# Patient Record
Sex: FEMALE
[De-identification: names, ages, dates, MRNs, and addresses within clinical notes are randomized per-mention and may not be internally consistent; named-entity substitution may affect disease eponyms.]

---

## 2020-03-24 LAB
ALBUMIN SERPL-MCNC: 3.7 G/DL (ref 3.5–5)
ALBUMIN/GLOB SERPL: 1 {RATIO} (ref 0.8–2)
ALP SERPL-CCNC: 147 IU/L (ref 40–150)
ALT SERPL-CCNC: 13 IU/L (ref 0–55)
ANION GAP SERPL CALC-SCNC: 12.3 MMOL/L (ref 8–16)
BASOPHILS # BLD AUTO: 0.1 10*3/UL (ref 0–0.1)
BASOPHILS NFR BLD AUTO: 1.2 % (ref 0–1)
BUN SERPL-MCNC: 28 MG/DL (ref 7–26)
BUN/CREAT SERPL: 17 (ref 6–25)
CALCIUM SERPL-MCNC: 9.2 MG/DL (ref 8.4–10.2)
CHLORIDE SERPL-SCNC: 108 MMOL/L (ref 98–107)
CO2 SERPL-SCNC: 24 MMOL/L (ref 22–29)
DEPRECATED INR PLAS: 1
DEPRECATED NEUTROPHILS # BLD AUTO: 6.3 10*3/UL (ref 2.1–6.9)
EGFRCR SERPLBLD CKD-EPI 2021: 31 ML/MIN (ref 60–?)
EOSINOPHIL # BLD AUTO: 0.2 10*3/UL (ref 0–0.4)
EOSINOPHIL NFR BLD AUTO: 2.2 % (ref 0–6)
ERYTHROCYTE [DISTWIDTH] IN CORD BLOOD: 12.8 % (ref 11.7–14.4)
GLOBULIN PLAS-MCNC: 3.6 G/DL (ref 2.3–3.5)
GLUCOSE SERPLBLD-MCNC: 199 MG/DL (ref 74–118)
HCT VFR BLD AUTO: 44.1 % (ref 34.2–44.1)
HGB BLD-MCNC: 13.8 G/DL (ref 12–16)
LYMPHOCYTES # BLD: 1.6 10*3/UL (ref 1–3.2)
LYMPHOCYTES NFR BLD AUTO: 18.4 % (ref 18–39.1)
MCH RBC QN AUTO: 29.4 PG (ref 28–32)
MCHC RBC AUTO-ENTMCNC: 31.3 G/DL (ref 31–35)
MCV RBC AUTO: 93.8 FL (ref 81–99)
MONOCYTES # BLD AUTO: 0.4 10*3/UL (ref 0.2–0.8)
MONOCYTES NFR BLD AUTO: 4.9 % (ref 4.4–11.3)
NEUTS SEG NFR BLD AUTO: 73.1 % (ref 38.7–80)
PLATELET # BLD AUTO: 263 X10E3/UL (ref 140–360)
POTASSIUM SERPL-SCNC: 4.3 MMOL/L (ref 3.5–5.1)
PROTHROMBIN TIME: 13.8 SECONDS (ref 11.9–14.5)
RBC # BLD AUTO: 4.7 X10E6/UL (ref 3.6–5.1)
SODIUM SERPL-SCNC: 140 MMOL/L (ref 136–145)

## 2020-03-26 ENCOUNTER — HOSPITAL ENCOUNTER (OUTPATIENT)
Dept: HOSPITAL 88 - CATH LAB | Age: 75
Discharge: HOME | End: 2020-03-26
Attending: INTERNAL MEDICINE
Payer: MEDICARE

## 2020-03-26 VITALS — DIASTOLIC BLOOD PRESSURE: 77 MMHG | SYSTOLIC BLOOD PRESSURE: 172 MMHG

## 2020-03-26 VITALS — DIASTOLIC BLOOD PRESSURE: 72 MMHG | SYSTOLIC BLOOD PRESSURE: 118 MMHG

## 2020-03-26 VITALS — DIASTOLIC BLOOD PRESSURE: 77 MMHG | SYSTOLIC BLOOD PRESSURE: 112 MMHG

## 2020-03-26 VITALS — DIASTOLIC BLOOD PRESSURE: 66 MMHG | SYSTOLIC BLOOD PRESSURE: 117 MMHG

## 2020-03-26 VITALS — BODY MASS INDEX: 24.35 KG/M2 | HEIGHT: 60 IN | WEIGHT: 124 LBS

## 2020-03-26 VITALS — DIASTOLIC BLOOD PRESSURE: 61 MMHG | SYSTOLIC BLOOD PRESSURE: 127 MMHG

## 2020-03-26 VITALS — SYSTOLIC BLOOD PRESSURE: 111 MMHG | DIASTOLIC BLOOD PRESSURE: 77 MMHG

## 2020-03-26 VITALS — SYSTOLIC BLOOD PRESSURE: 127 MMHG | DIASTOLIC BLOOD PRESSURE: 61 MMHG

## 2020-03-26 VITALS — DIASTOLIC BLOOD PRESSURE: 70 MMHG | SYSTOLIC BLOOD PRESSURE: 137 MMHG

## 2020-03-26 VITALS — DIASTOLIC BLOOD PRESSURE: 74 MMHG | SYSTOLIC BLOOD PRESSURE: 137 MMHG

## 2020-03-26 VITALS — DIASTOLIC BLOOD PRESSURE: 80 MMHG | SYSTOLIC BLOOD PRESSURE: 118 MMHG

## 2020-03-26 VITALS — DIASTOLIC BLOOD PRESSURE: 72 MMHG | SYSTOLIC BLOOD PRESSURE: 177 MMHG

## 2020-03-26 DIAGNOSIS — Z79.82: ICD-10-CM

## 2020-03-26 DIAGNOSIS — I10: ICD-10-CM

## 2020-03-26 DIAGNOSIS — I25.2: ICD-10-CM

## 2020-03-26 DIAGNOSIS — Z82.49: ICD-10-CM

## 2020-03-26 DIAGNOSIS — Z95.1: ICD-10-CM

## 2020-03-26 DIAGNOSIS — Z01.812: ICD-10-CM

## 2020-03-26 DIAGNOSIS — Z79.02: ICD-10-CM

## 2020-03-26 DIAGNOSIS — I25.810: Primary | ICD-10-CM

## 2020-03-26 DIAGNOSIS — E78.5: ICD-10-CM

## 2020-03-26 PROCEDURE — 93459 L HRT ART/GRFT ANGIO: CPT

## 2020-03-26 PROCEDURE — 80053 COMPREHEN METABOLIC PANEL: CPT

## 2020-03-26 PROCEDURE — 99153 MOD SED SAME PHYS/QHP EA: CPT

## 2020-03-26 PROCEDURE — 85025 COMPLETE CBC W/AUTO DIFF WBC: CPT

## 2020-03-26 PROCEDURE — 92928 PRQ TCAT PLMT NTRAC ST 1 LES: CPT

## 2020-03-26 PROCEDURE — 85610 PROTHROMBIN TIME: CPT

## 2020-03-26 PROCEDURE — 99152 MOD SED SAME PHYS/QHP 5/>YRS: CPT

## 2020-03-26 PROCEDURE — 36415 COLL VENOUS BLD VENIPUNCTURE: CPT

## 2020-03-26 NOTE — OPERATIVE REPORT
DATE OF PROCEDURE:  

 

SURGEON:  David Nelson DO

 

PROCEDURES PERFORMED:  

1. Conscious sedation, 45 minutes.

2. Selective coronary angiography x2.

3. Selective graft angiography.

4. Left heart catheterization.

5. Injection procedure for supravalvular aortography.

6. Percutaneous transluminal coronary angioplasty and drug-eluting stent placement to

the right coronary artery. 

 

PREPROCEDURE DIAGNOSES:  

1. Systolic congestive heart failure.

2. Coronary artery disease, status post coronary artery bypass graft surgery.

 

POSTPROCEDURE DIAGNOSES:  

1. Systolic congestive heart failure.

2. Coronary artery disease, status post coronary artery bypass graft surgery.

 

ESTIMATED BLOOD LOSS:  Less than 20 mL.

 

SPECIMENS REMOVED:  None.

 

PROCEDURE IN DETAIL:  After informed consent was obtained, the patient was brought to

the cardiac catheterization laboratory in a fasting and nonsedated state.  Bilateral

groins were prepped and draped in the usual sterile fashion.  Lidocaine 2% was

infiltrated over the right anterior groin for local anesthesia.  Using a micropuncture

needle, right common femoral artery was accessed via the modified Seldinger technique

and a 5-[place] sheath was placed.  The patient received fentanyl and midazolam given by

the nurse.  Myself and the nurse monitored the patient's physiologic and conscious

status throughout the procedure for 45 minutes.  Diagnostic coronary angiography was

performed using a JL4 and 3DRC catheters.  Diagnostic graft angiography was performed

using a 3DRC and LCB catheters.  Aortography was performed using a pigtail catheter.

This was also used to perform the left heart catheterization.  Diagnostic imaging

revealed a patent left internal mammary artery to the LAD and a patent saphenous vein

graft to an obtuse marginal.  There were two other saphenous vein grafts that were

occluded.  Decision was made to perform percutaneous coronary intervention on the right

coronary artery due to a proximal 90% stenosis.  The right coronary artery was

cannulated with a 6-[place] JR4 guide catheter.  The patient received systemic heparin

for therapeutic anticoagulation.  ACT was greater than 250.  The patient was given

clopidogrel.  The proximal lesion was predilated with a 2.5 balloon.  The lesion was

stented with a 3 x 20 Synergy drug-eluting stent.  The patient tolerated the procedure

well with no immediate complications.  There was excellent angioplasty and stenting

results without any evidence of edge dissection, distal embolization, or vessel

perforation at the end of the case.  She was transferred back to room in stable

condition.  Hemostasis was achieved via Mynx device. 

 

PROCEDURE FINDINGS:  

1. There is severe diffuse disease in the left main.

2. There is a bifurcating stent in the LAD and first diagonal.  The diagonal branch has

mild to moderate disease.  The LAD is occluded just after the stent. 

3. The ramus intermedius coronary artery has mild-to-moderate diffuse disease.

4. The left circumflex coronary provides, has a proximal 70% stenosis.  There is

competitive flow seen in obtuse marginal vessel. 

5. The right coronary artery has a proximal 90% stenosis.  The PDA has a severe subtotal

stenosis.  This vessel fills via collaterals from the left coronary system. 

6. There are two saphenous vein grafts that are occluded.  The third vein graft could

not be selectively cannulated, however, fills when aortography was performed. 

7. Left internal mammary artery is patent and fills the left anterior descending

coronary artery distally. 

8. Left ventricular end-diastolic pressure is 20 mmHg with no aortic valve gradient

present upon pullback. 

 

INTERVENTIONAL RESULTS:  Successful percutaneous coronary intervention of the right

coronary artery.  Pre-PCI MELE flow was 2.  Post PCI MELE flow was 3.  Postprocedure

stenosis less than 10%. 

 

IMPRESSION:  

1. Severe multivessel coronary artery disease with a patent saphenous vein and left

internal mammary artery grafts. 

2. Severe right coronary artery stenosis.

 

RECOMMENDATIONS:  The patient is now status post percutaneous coronary intervention of

the right coronary artery.  Continue dual antiplatelet therapy and aggressive medical

therapy for heart failure and coronary artery disease. 

 

 

 

 

______________________________

DO BRITTANY Baca/MODL

D:  03/26/2020 09:55:10

T:  03/26/2020 10:21:27

Job #:  [id number]

## 2020-03-26 NOTE — NUR
1330pm CATH LAB RECOVERY  DISCHARGE NURSING 
NOTE--------------------------------------------------------



Pt meets DC criteria.Rt groin assessed for s/s of complication and presence of hematoma. 
Skin warm, dry, no discolor, and pulses present. IV removed from left hand. Distal tip 
appears intact. VS WNL. Pt denies pain, sob, or need at this time. Family at bedside. Review 
of discharge paperwork and follow up instructions. verbalized understanding. Pt to 
wheelchair and transported to front of hospital. Transferred to private vehicle under own 
strength w/o incident with DC paperwork in hand. - karlie/rn

## 2020-03-26 NOTE — XMS REPORT
Patient Summary Document

                             Created on: 2020



CODY CHAPPELL

External Reference #: [id number]

: 1945

Sex: Female



Demographics







                          Address                   [address] 

[place]  [address]

 

                          Home Phone                [phone]

 

                          Preferred Language        Unknown

 

                          Marital Status            Unknown

 

                          Hindu Affiliation     Unknown

 

                          Race                      Unknown

 

                          Ethnic Group              Unknown





Author







                          Author                    [facility]

 

                          Address                   Unknown

 

                          Phone                     Unavailable







Support







                Name            Relationship    Address         Phone

 

                    JOBY ROSALES      PRS                 [address] [place]  [id number] [phone]

 

                    JASMIN ARAUZ     PRS                 [address], [place]  [id number] [phone]







Care Team Providers







                    Care Team Member Name    Role                Phone

 

                          Unavailable               Unavailable







Payers







             Payer Name    Policy Type    Policy Number    Effective Date    Expiration Date







Problems

This patient has no known problems.



Allergies, Adverse Reactions, Alerts







          Allergy Name    Allergy Type    Status    Severity    Reaction(s)    Onset Date    Inactive 

Date                      Treating Clinician        Comments

 

        morphine    DA      Active    MO              2019 00:00:00                     

 

        hydrocodone    DA      Active    MO              2019 00:00:00                     

 

        acetaminophen    DA      Active    MO              2019 00:00:00                     

 

        hydromorphone    DA      Active    MO              2019 00:00:00                     

 

        acetaminophen    DA      Active    MO              2018 00:00:00                     

 

        morphine    DA      Active    MO              2018 00:00:00                     

 

        hydrocodone    DA      Active    MO              2018 00:00:00                     

 

        hydromorphone    DA      Active    MO              2018 00:00:00                     







Medications

This patient has no known medications.



Encounters







             Start Date/Time    End Date/Time    Encounter Type    Admission Type    Attending Clinicians

                    Care Facility       Care Department     Encounter ID

 

        2020 23:11:00    2020 23:11:00    Outpatient    E               [facility]    MED     [id number]







Results







           Test Description    Test Time    Test Comments    Text Results    Atomic Results    Result

 Comments

 

                ARTERIAL BLOOD GAS    2019 23:57:00                      

 

   

 

                ARTERIAL BLOOD GAS PH (test code=PHA)    7.430           7.35-7.45        

 

                ARTERIAL BLOOD GAS PCO2 (test code=PCO2A)    37.0 mmHg       35-45            

 

                ARTERIAL BLOOD GAS PO2 (test code=PO2A)    85 mmHg                    

 

                BICARBONATE TOTAL HCO3 (test code=HCO3)    24.5 mmol/L     22.0-26.0        

 

                BASE EXCESS (test code=LIDIA)    0.0 mmol/L      -4-4             

 

                ABG O2 SATURATION (test code=SATA)    97 %                       

 

                ABG DELIVERY (test code=DONATO)    Room Air                        Performed by certified  at  

[facility]

 

                ABG TEMPERATURE (test code=TEMPA)    98.6 F                           

 

                ABG SITE (test code=SITEA)    L Rad                            

 

                TCO2 ARTERIAL (test code=TCO2A)    26                               





- XR CHEST 1 -93-82 23:41:00 FAX: Edwardo Lopez [phone]
   [place]:   St: PRE----------
---------------------------------------------------------------------  Name:   CODY HENDERSON             [facility]                    : [birth year]  Age/S: 74/F           [address] [place] [address]         Unit #: [id number]    
 Loc: [address] [address]                 Phys: Edwardo Rodas MD 
                                              Acct: [id number] Dis Date:      
        Status: PRE ER                                 PHONE #: [phone]    
Exam Date:     2019                   FAX #: [phone]     
Reason: SOB                                                EXAMS:               
                               CPT CODE:      [id number] XR CHEST 1 V            
                  [id number]                    Study:  - XR CHEST 1 V 2019 
10:48 PM               Patient Name: CODY CHAPPELL MR: [id number]       YOB: 1945; Age: 74 years  y/o Female       Ordering Physician: Edwardo gonzalez MD               Clinical Indication: SOB               Comparison: 2019 x-ray               FINDINGS               LUNGS: The lungs are clear o
f consolidation, pleural effusion, and       pneumothorax.               HEART A
ND MEDIASTINUM: Heart size at the upper limits of normal to       mildly enlarge
d with a left subclavian automatic implantable cardiac       defibrillator. Medi
an sternotomy wires.               LINES: None.               OSSEOUS STRUCTURES
: No fracture, dislocation, or suspicious focal       osseous lesion.           
   OTHER: None.                         IMPRESSION:                   No acute 
abnormality as above discussed.                                       SL: MIKE  
       ** Electronically Signed by KATHY Baeza on 2019 at 2000 **   
                  Reported and signed by: Aneudy Baeza M.D.      CC: Edwardo galvez MD                                                                     
                                               Technologist: RT Yoni
(R)                                  Trnscrd Date/Time/By: 2019 (127) : BRANDEE
y: [email or web address]          Orig Print D/T: S: 2019 ([id number])                     
   PAGE  1                       Signed Report                               
HEPATIC FUNCTION [id number] 23:35:00* 





                Test Item       Value           Reference Range    Comments

 

                TOTAL PROTEIN (test code=PROT)    8.5 g/dL        6.4-8.2          

 

                ALBUMIN (test code=ALB)    3.70 g/dL       3.4-5.0          

 

                BILIRUBIN TOTAL (test code=BILT)    0.6 MG/DL       <1.5             

 

                BILIRUBIN DIRECT (test code=BILD)    0.20 MG/DL      0.0-0.30         

 

                BILIRUBIN INDIRECT (test code=BILIND)    0.40 MG/DL                       

 

                SGOT/AST (test code=AST)    20 IUnit/L      15-37            

 

                SGPT/ALT (test code=ALT)    18 IUnit/L      15-65            

 

                ALKALINE PHOSPHATASE TOTAL (test code=ALKP)    166 IUnit/L                





[id number] 23:35:00* 





                Test Item       Value           Reference Range    Comments

 

                LIPASE (test code=LIP)    154 IUnit/L                





[id number] 23:35:00* 





                Test Item       Value           Reference Range    Comments

 

                MAGNESIUM (test code=MAG)    2.10 mg/dL      1.8-2.4          





TSH REFLEX TO [id number] 23:35:00* 





                Test Item       Value           Reference Range    Comments

 

                TSH REFLEX TO FT4 (test code=TSHREFLEX)    6.36 IU/mL      0.42-5.47        





B-TYPE NATRIURETIC [id number] 23:33:00* 





                Test Item       Value           Reference Range    Comments

 

                B-TYPE NATRIURETIC PEPTIDE (test code=BNP)    829.0 PG/ML     0-100            





PROTHROMBIN [id number] 23:21:00* 





                Test Item       Value           Reference Range    Comments

 

                PROTHROMBIN TIME PATIENT (test code=PTP)    11.2 SECONDS    9.3-12.9         

 

                INTERNATIONAL NORMAL RATIO (test code=INR)    1.0             0.8-1.2                         TARGET

 INR BY INDICATION   Indication                                      INR1. 
Prophylaxis of venous thrombosis             2.0 - 3.0   (orthopedic surgery), 
Prophylaxis of   venous thrombosis (other than high-risk   surgery), Treatment 
of Deep Vein   Thrombosis/Pulmonary Embolism, Prevention   of systemic embolism 
- Tissue heart valves,   Acute Myocardial Infarction (to prevent   systemic 
embolism), Valvular heart disease,   Atrial Fibrillation, Bileaflet mechanical  
valve in aortic position.2. Mechanical prosthetic valves (high risk),    2.5 - 
3.5   Presence of Lupus Anticoagulant or   Antiphospholipid Antibodies, 
Prevention of   systemic embolism - Acute Myocardial Infarction   (to prevent 
recurrent infarct).





THROMBOPLASTIN TIME [id number] 23:21:00* 





                Test Item       Value           Reference Range    Comments

 

                THROMBOPLASTIN TIME PARTIAL (test code=PTT)    22.3 Seconds    25.0-39.5           Therapeutic

 Range: 50.4 - 88.3 Seconds        **** Effective 2019 ****





[id number] 23:21:00* 





                Test Item       Value           Reference Range    Comments

 

                D-DIMER (test code=DDIMER)    571 ng/mlFEU    <=500            THROMBOSIS AND/OR PULMONARY EMBOLISM

 AND THE CLINICAL CUT- OFF VALUE FOR EXCLUSION (500 ng/mL FEU) OF THESE 
CONDITIONSIS VALIDATED BY THE  OF THE METHOD. A NEGATIVE D-DIMER 
RESULT WHEN COMBINED WITH A CLINICALASSESSMENT OF LOW PRETEST PROBABILITY HAS 
BEEN SHOWN TO HAVEA HIGH NEGATIVE PREDICTIVE VALUE OF DVT OR PE. D-DIMER VALUES 
>500 ng/mL FEU ARE NOT DIAGNOSTIC FOR DVT, PEor DIC WITHOUT OTHER CONFIRMATORY 
TESTS AND APPROPRIATECLINICAL EUALUATIONS.





TROPONIN-I [id number] 23:12:00* 





                Test Item       Value           Reference Range    Comments

 

                TROPONIN-I RAPID (test code=TROPIRAP)    0.00 ng/mL      0.00-0.08       Performed by certified

  at  [facility]          Negative:          <=0.08          
Positive:          >=0.09An elevated troponin value alone is not sufficient 
todiagnose a myocardial infarction. Rather, the patient sclinical presentation 
(history, physical exam) and ECGshould be used in conjunction with troponin in 
thediagnostic evaluation of suspected myocardial infarction. Aserial sampling 
protocol is recommended to facilitate the identification of temporal changes in 
troponin levels characteristic of MI.





CBC W/AUTO [id number] 23:09:00* 





                Test Item       Value           Reference Range    Comments

 

                WHITE BLOOD CELL (test code=WBC)    10.77 x10 3/uL    4.5-11.0         

 

                RED BLOOD CELL (test code=RBC)    4.70 x10 6/uL    3.54-5.02        

 

                HEMOGLOBIN (test code=HGB)    14.1 g/dL       11.0-15.0        

 

                HEMATOCRIT (test code=HCT)    44.4 %          33.0-45.0        

 

                MEAN CELL VOLUME (test code=MCV)    94.5 fL         81.0-99.0        

 

                MEAN CELL HGB (test code=MCH)    30.0 pg         27.0-33.0        

 

                MEAN CELL HGB CONCETRATION (test code=MCHC)    31.8 g/dL       33.0-37.0        

 

                RED CELL DISTRIBUTION WIDTH CV (test code=RDW)    13.5 %          11.5-14.5        

 

                RED CELL DISTRIBUTION WIDTH SD (test code=RDW-SD)    47.0 fL         37.0-54.0        

 

                PLATELET COUNT (test code=PLT)    216 x10 3/uL    150-400          

 

                MEAN PLATELET VOLUME (test code=MPV)    9.9 fL          7.0-9.0          

 

                NEUTROPHIL % (test code=NT%)    66.3 %          56.0-77.0        

 

                IMMATURE GRANULOCYTE % (test code=IG%)    0.5 %           0.0-2.0          

 

                LYMPHOCYTE % (test code=LY%)    23.4 %          14.0-32.0        

 

                MONOCYTE % (test code=MO%)    6.2 %           4.8-9.0          

 

                EOSINOPHIL % (test code=EO%)    2.3 %           0.3-3.7          

 

                BASOPHIL % (test code=BA%)    1.3 %           0.0-2.0          

 

                NUCLEATED RBC % (test code=NRBC%)    0.0 %           0-0              

 

                NEUTROPHIL # (test code=NT#)    7.14 x10 3/uL    2.0-7.6          

 

                IMMATURE GRANULOCYTE # (test code=IG#)    0.05 x10 3/uL    0.00-0.03        

 

                LYMPHOCYTE # (test code=LY#)    2.52 x10 3/uL    1.0-3.8          

 

                MONOCYTE # (test code=MO#)    0.67 x10 3/uL    0.1-0.8          

 

                EOSINOPHIL # (test code=EO#)    0.25 x10 3/uL    0.0-0.2          

 

                BASOPHIL # (test code=BA#)    0.14 x10 3/uL    0.0-0.2          

 

                NUCLEATED RBC # (test code=NRBC#)    0.00 x10 3/uL    0.0-0.1          

 

                MANUAL DIFF REQUIRED (test code=MDIFF)    NO                               





CHEMISTRY 8 [id number] 23:07:00* 





                Test Item       Value           Reference Range    Comments

 

                ISTAT-SODIUM (test code=NAP)     MMOL/L         134-147          

 

                ISTAT-POTASSIUM (test code=KP)     MMOL/L         3.4-5.0          

 

                ISTAT-CHLORIDE (test code=CLP)     MMOL/L         100-108          

 

                ISTAT CARBON DIOXIDE (test code=ISTAT-CO2)     mmol/L         21-33            

 

                ISTAT CALCIUM IONIZED (test code=ISTAT-EDA)     MG/DL          1.12-1.32        

 

                ISTAT-GLUCOSE (test code=GLUP)     MG/DL                     

 

                ISTAT-BUN (test code=BUNP)     MG/DL          7-18             

 

                BEDSIDE CREATININE (test code=CREATBED)     MG/DL          0.6-1.3          

 

                GLOMERULAR FILTRATION RATE POC (test code=GFRBED)    26 ML/MIN                        





CHEMISTRY 8 [id number] 23:07:00* 





                Test Item       Value           Reference Range    Comments

 

                ISTAT-SODIUM (test code=NAP)    140 MMOL/L      134-147          

 

                ISTAT-POTASSIUM (test code=KP)    4.5 MMOL/L      3.4-5.0          

 

                ISTAT-CHLORIDE (test code=CLP)    106 MMOL/L      100-108         Performed by certified 

 at  [facility]

 

                ISTAT CARBON DIOXIDE (test code=ISTAT-CO2)    25.0 mmol/L     21-33            

 

                ISTAT CALCIUM IONIZED (test code=ISTAT-EDA)    1.08 MG/DL      1.12-1.32        

 

                ISTAT-GLUCOSE (test code=GLUP)    144 MG/DL                  

 

                ISTAT-BUN (test code=BUNP)    26 MG/DL        7-18             

 

                BEDSIDE CREATININE (test code=CREATBED)    2.0 MG/DL       0.6-1.3          

 

                GLOMERULAR FILTRATION RATE POC (test code=GFRBED)    26 ML/MIN                        





[id number] 11:48:00* 





                Test Item       Value           Reference Range    Comments

 

                GLUBED (test code=GLUBED)    221 MG/DL                 Performed by certified  at

  [facility]





B-TYPE NATRIURETIC [id number] 09:15:00* 





                Test Item       Value           Reference Range    Comments

 

                B-TYPE NATRIURETIC PEPTIDE (test code=BNP)    402.8 PG/ML     0-100            





[id number] 08:45:00* 





                Test Item       Value           Reference Range    Comments

 

                GLUBED (test code=GLUBED)    153 MG/DL                 Performed by certified  at

  [facility]





BASIC METABOLIC [id number] 08:34:00* 





                Test Item       Value           Reference Range    Comments

 

                SODIUM (test code=NA)    140 mEq/L       134-147          

 

                POTASSIUM (test code=K)    3.7 mEq/L       3.4-5.0          

 

                CHLORIDE (test code=CL)    104 mEq/L       100-108          

 

                CARBON DIOXIDE (test code=CO2)    26 mEq/L        21-33            

 

                ANION GAP (test code=GAP)    14              0-20             

 

                GLUCOSE (test code=GLU)    116 mg/dL                  

 

                BLOOD UREA NITROGEN (test code=BUN)    45 mg/dL        7-18             

 

                GLOMERULAR FILTRATION RATE (test code=GFR)    17.3            70-80           Units of measure=ml/min/1.73

 m2

 

                CREATININE (test code=CREAT)    2.7 mg/dL       0.6-1.3          

 

                CALCIUM (test code=CA)    8.5 mg/dL       8.0-10.5         





[id number] 08:34:00* 





                Test Item       Value           Reference Range    Comments

 

                MAGNESIUM (test code=MAG)    2.40 mg/dL      1.8-2.4          





CBC W/AUTO [id number] 08:02:00* 





                Test Item       Value           Reference Range    Comments

 

                WHITE BLOOD CELL (test code=WBC)    9.07 x10 3/uL    4.5-11.0         

 

                RED BLOOD CELL (test code=RBC)    4.87 x10 6/uL    3.54-5.02        

 

                HEMOGLOBIN (test code=HGB)    13.3 g/dL       11.0-15.0        

 

                HEMATOCRIT (test code=HCT)    42.6 %          33.0-45.0        

 

                MEAN CELL VOLUME (test code=MCV)    87.5 fL         81.0-99.0        

 

                MEAN CELL HGB (test code=MCH)    27.3 pg         27.0-33.0        

 

                MEAN CELL HGB CONCETRATION (test code=MCHC)    31.2 g/dL       33.0-37.0        

 

                RED CELL DISTRIBUTION WIDTH CV (test code=RDW)    14.9 %          11.5-14.5        

 

                RED CELL DISTRIBUTION WIDTH SD (test code=RDW-SD)    47.5 fL         37.0-54.0        

 

                PLATELET COUNT (test code=PLT)    269 x10 3/uL    150-400          

 

                MEAN PLATELET VOLUME (test code=MPV)    9.5 fL          7.0-9.0          

 

                NEUTROPHIL % (test code=NT%)    76.2 %          56.0-77.0        

 

                IMMATURE GRANULOCYTE % (test code=IG%)    0.3 %           0.0-2.0          

 

                LYMPHOCYTE % (test code=LY%)    14.6 %          14.0-32.0        

 

                MONOCYTE % (test code=MO%)    6.0 %           4.8-9.0          

 

                EOSINOPHIL % (test code=EO%)    1.4 %           0.3-3.7          

 

                BASOPHIL % (test code=BA%)    1.5 %           0.0-2.0          

 

                NUCLEATED RBC % (test code=NRBC%)    0.0 %           0-0              

 

                NEUTROPHIL # (test code=NT#)    6.91 x10 3/uL    2.0-7.6          

 

                IMMATURE GRANULOCYTE # (test code=IG#)    0.03 x10 3/uL    0.00-0.03        

 

                LYMPHOCYTE # (test code=LY#)    1.32 x10 3/uL    1.0-3.8          

 

                MONOCYTE # (test code=MO#)    0.54 x10 3/uL    0.1-0.8          

 

                EOSINOPHIL # (test code=EO#)    0.13 x10 3/uL    0.0-0.2          

 

                BASOPHIL # (test code=BA#)    0.14 x10 3/uL    0.0-0.2          

 

                NUCLEATED RBC # (test code=NRBC#)    0.00 x10 3/uL    0.0-0.1          

 

                MANUAL DIFF REQUIRED (test code=MDIFF)    NO                               





[id number] 20:25:00* 





                Test Item       Value           Reference Range    Comments

 

                GLUBED (test code=GLUBED)    99 MG/DL                  Performed by certified  at 

 [facility]





[id number] 17:18:00* 





                Test Item       Value           Reference Range    Comments

 

                GLUBED (test code=GLUBED)    216 MG/DL                 Performed by certified  at

  [facility]





[id number] 11:12:00* 





                Test Item       Value           Reference Range    Comments

 

                TROPONIN-I (test code=TROPI)    0.017 ng/mL     0.000-0.045     Negative:             <=0.045

 Positive:             >=0.046 Correlation with serial results, other cardiac 
markers andclinical findings is necessary to determine the clinicalsignificance 
of this result. Results using different methodologies should not be comparedto 
one another as quantitative results may vary by method.





COMMENTS: 3 troponins total (including troponin done in ED)[id number] 
07:33:00* 





                Test Item       Value           Reference Range    Comments

 

                TROPONIN-I (test code=TROPI)    < 0.015 ng/mL    0.000-0.045     Negative:             

<=0.045 Positive:             >=0.046 Correlation with serial results, other 
cardiac markers andclinical findings is necessary to determine the 
clinicalsignificance of this result. Results using different methodologies 
should not be comparedto one another as quantitative results may vary by method.





COMMENTS: 3 troponins total (including troponin done in ED)- CT HEAD/BRAIN W/O 
[id number] 21:08:00  Name: CODY CHAPPELL              [facility]
                   : 1945 Age/S: 74  / F         [address] [place] [address]         Unit #: [id number]     Loc:               [place] [address]        
        Phys: Cristobal Polanco MD                                                
 Acct: [id number]  Dis Date:               Status: REG ER                     
            PHONE #: [phone]     Exam Date: 2019               
     FAX #: [phone]      Reason: hypertension, headache                    
         EXAMS:                                               CPT CODE:      
[id number] CT HEAD/BRAIN W/O CONT                     [id number]                    
UNENHANCED CT HEAD               INDICATION:  hypertension, headache.           
   TECHNIQUE: Unenhanced CT was performed from the skull vertex to the       
foramen magnum with axial, coronal and sagittal reconstructions.  CT       
imaging performed at this location utilizes radiation dose       optimization 
technique which includes one or more of the followin)       Automated 
exposure control; 2) Adjustment of the mA and/or kV       according to patient's
size; 3) Use of iterative reconstruction       techniques.  DLP (mGy-cm): 420   
           COMPARISONS: CT head 2019               FINDINGS:               
The paranasal sinuses are clear as visualized.               The mastoid air 
cells and middle ears appear clear as visualized.                There is no 
acute depressed skull fracture.               There is a moderate burden of 
atherosclerotic vascular calcification       of the intracranial arteries.  
There is a stable chronic lacunar       infarct in the superior right 
cerebellum.  There is a stable moderate       burden of chronic small vessel 
ischemic disease lesions in the       frontoparietal white matter.  There is a 
small chronic stable cortical       infarct in the right frontal vertex.  There 
is mild generalized brain       parenchymal volume loss. The cerebral ventricles
are normal caliber.        There is no cerebral mass effect, midline shift, 
intracranial       hemorrhage or acute large vessel territory cerebral cortical 
edema.                   IMPRESSION:                   1.  There is no acute 
intracranial process.              2.  There is a stable moderate burden of 
chronic small vessel ischemic         disease lesions in the frontoparietal w
adam matter.  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
al cortex near the vertex.                              ** Electronically Signed
by SVETLANA Jesus **            **            on 2019 at          
  **                      Reported and signed by: Kai Jesus D.O.      PAGE 
1                       Signed Report                    (CONTINUED)   Name: 
CODY CHAPPELL              [facility] [facility]                    : 1945 Age/S: 74  / F         [address] [place] [address] Blvd         Unit #: [id number]  
  Loc:               [place] [address]                 Phys: Cristobal Polanco MD 
                                                Acct: [id number]  Dis Date:   
           Status: REG ER                                  PHONE #: [phone]
    Exam Date: 2019                     FAX #: [phone]      
Reason: hypertension, headache                              EXAMS:              
                                CPT CODE:      [id number] CT HEAD/BRAIN W/O CONT 
                   92541               <Continued>                              
        CC: Cristobal Polanco MD                                                   
                                                                   
Technologist:RT Katiana(R)(CT)           CTDI:        DLP:        Trnscb 
Date/Time: 2019 () t.[id number]                       Orig Print D/T: S:
2019 ()      PAGE  2                       Signed Report              
                B-TYPE NATRIURETIC [id number] 20:14:00* 





                Test Item       Value           Reference Range    Comments

 

                B-TYPE NATRIURETIC PEPTIDE (test code=BNP)    1906.7 PG/ML    0-100            





BASIC METABOLIC [id number] 19:47:00* 





                Test Item       Value           Reference Range    Comments

 

                SODIUM (test code=NA)    143 mEq/L       134-147          

 

                POTASSIUM (test code=K)    4.5 mEq/L       3.4-5.0          

 

                CHLORIDE (test code=CL)    109 mEq/L       100-108          

 

                CARBON DIOXIDE (test code=CO2)    25 mEq/L        21-33            

 

                ANION GAP (test code=GAP)    14              0-20             

 

                GLUCOSE (test code=GLU)    136 mg/dL                  

 

                BLOOD UREA NITROGEN (test code=BUN)    26 mg/dL        7-18             

 

                GLOMERULAR FILTRATION RATE (test code=GFR)    25.9            70-80           Units of measure=ml/min/1.73

 m2

 

                CREATININE (test code=CREAT)    1.9 mg/dL       0.6-1.3          

 

                CALCIUM (test code=CA)    8.9 mg/dL       8.0-10.5         





HEPATIC FUNCTION [id number] 19:47:00* 





                Test Item       Value           Reference Range    Comments

 

                TOTAL PROTEIN (test code=PROT)    7.7 g/dL        6.4-8.2          

 

                ALBUMIN (test code=ALB)    3.70 g/dL       3.4-5.0          

 

                BILIRUBIN TOTAL (test code=BILT)    1.80 mg/dL      0.0-1.0          

 

                BILIRUBIN DIRECT (test code=BILD)    0.50 MG/DL      0.0-0.30         

 

                BILIRUBIN INDIRECT (test code=BILIND)    1.30 MG/DL                       

 

                SGOT/AST (test code=AST)    20 IUnit/L      15-37            

 

                SGPT/ALT (test code=ALT)    12 IUnit/L      15-65            

 

                ALKALINE PHOSPHATASE TOTAL (test code=ALKP)    130 IUnit/L                





[id number] 19:47:00* 





                Test Item       Value           Reference Range    Comments

 

                LIPASE (test code=LIP)    113 IUnit/L                





[id number] 19:47:00* 





                Test Item       Value           Reference Range    Comments

 

                TROPONIN-I (test code=TROPI)    < 0.015 ng/mL    0.000-0.045     Negative:             

<=0.045 Positive:             >=0.046 Correlation with serial results, other 
cardiac markers andclinical findings is necessary to determine the 
clinicalsignificance of this result. Results using different methodologies 
should not be comparedto one another as quantitative results may vary by method.





CBC W/AUTO [id number] 19:33:00* 





                Test Item       Value           Reference Range    Comments

 

                WHITE BLOOD CELL (test code=WBC)    9.89 x10 3/uL    4.5-11.0         

 

                RED BLOOD CELL (test code=RBC)    5.18 x10 6/uL    3.54-5.02        

 

                HEMOGLOBIN (test code=HGB)    14.1 g/dL       11.0-15.0        

 

                HEMATOCRIT (test code=HCT)    45.4 %          33.0-45.0        

 

                MEAN CELL VOLUME (test code=MCV)    87.6 fL         81.0-99.0        

 

                MEAN CELL HGB (test code=MCH)    27.2 pg         27.0-33.0        

 

                MEAN CELL HGB CONCETRATION (test code=MCHC)    31.1 g/dL       33.0-37.0        

 

                RED CELL DISTRIBUTION WIDTH CV (test code=RDW)    15.0 %          11.5-14.5        

 

                RED CELL DISTRIBUTION WIDTH SD (test code=RDW-SD)    47.7 fL         37.0-54.0        

 

                PLATELET COUNT (test code=PLT)    269 x10 3/uL    150-400          

 

                MEAN PLATELET VOLUME (test code=MPV)    8.9 fL          7.0-9.0          

 

                NEUTROPHIL % (test code=NT%)    67.7 %          56.0-77.0        

 

                IMMATURE GRANULOCYTE % (test code=IG%)    0.4 %           0.0-2.0          

 

                LYMPHOCYTE % (test code=LY%)    23.1 %          14.0-32.0        

 

                MONOCYTE % (test code=MO%)    6.2 %           4.8-9.0          

 

                EOSINOPHIL % (test code=EO%)    0.9 %           0.3-3.7          

 

                BASOPHIL % (test code=BA%)    1.7 %           0.0-2.0          

 

                NUCLEATED RBC % (test code=NRBC%)    0.0 %           0-0              

 

                NEUTROPHIL # (test code=NT#)    6.70 x10 3/uL    2.0-7.6          

 

                IMMATURE GRANULOCYTE # (test code=IG#)    0.04 x10 3/uL    0.00-0.03        

 

                LYMPHOCYTE # (test code=LY#)    2.28 x10 3/uL    1.0-3.8          

 

                MONOCYTE # (test code=MO#)    0.61 x10 3/uL    0.1-0.8          

 

                EOSINOPHIL # (test code=EO#)    0.09 x10 3/uL    0.0-0.2          

 

                BASOPHIL # (test code=BA#)    0.17 x10 3/uL    0.0-0.2          

 

                NUCLEATED RBC # (test code=NRBC#)    0.00 x10 3/uL    0.0-0.1          

 

                MANUAL DIFF REQUIRED (test code=MDIFF)    NO                               





- XR CHEST 1 -93-24 19:25:00 FAX:         Cristobal Polanco MD   [phone]
   [place]:   St: PRE----------
---------------------------------------------------------------------  Name:   CODY HENDERSON             [facility]                    : [birth year]  Age/S: 74/F           [address] [place] [address]         Unit #: [id number]    
 Loc: [place] [address]                 Phys: Cristobal Polanco MD   
                                              Acct: [id number] Dis Date:      
        Status: PRE ER                                 PHONE #: [phone]    
Exam Date:     2019                   FAX #: [phone]     
Reason: Chest Pain                                         EXAMS:               
                               CPT CODE:      [id number] XR CHEST 1 V            
                  43902                    CHEST, SINGLE VIEW               H
ISTORY: Chest pain                 Comparison made to prior chest x-ray dated .               FINDINGS:               The heart is enlarged.  Pulmonary 
vascularity is normal.  There is       been previous midline sternotomy.  Multil
ead left subclavian       transvenous pacemaker/defibrillator position is stable
.                 IMPRESSION:                   Stable chest, no acute abnormali
ty demonstrated compared to 19.                             SL:01         
        ** Electronically Signed by KATHY Christian **          **       
      on 2019 at               **                      Reported and 
signed by: Salas Christian M.D.                     CC: Cristobal Ploanco MD    
                                                                                
                                 Technologist: WIN Pro RT(R); Lis Moyer RT(R)        Trnscrd Date/Time/By: 2019 () : By: Qing  
         Orig Print D/T: S: 2019 ()                         PAGE  1   
                   Signed Report                               - CT C-SPINE W/O 
[id number] 19:41:00  Name: CODY CHAPPELL              [facility]
                   : 1945 Age/S: 74  / F         [address] [place] [address]         Unit #: [id number]     Loc:               [place] [address]        
        Phys: Tricia Garcia  FNP                                               
 Acct: [id number]  Dis Date:               Status: REG ER                     
            PHONE #: [phone]     Exam Date: 2019               
     FAX #: [phone]      Reason: neck pain s/p fall                        
         EXAMS:                                               CPT CODE:      
[id number] CT C-SPINE W/O CONT                        50791                    CT
CERVICAL SPINE WITHOUT CONTRAST               INDICATION:  neck pain s/p fall.  
            TECHNIQUE: Unenhanced CT of the cervical spine was performed with   
   axial, coronal and sagittal reconstructions.  CT imaging performed at       
this location utilizes radiation dose optimization technique which       
includes one or more of the followin) Automated exposure control;       2) 
Adjustment of the mA and/or kV according to patient's size; 3) Use       of 
iterative reconstruction techniques.  DLP (mGy-cm): 146               
COMPARISONS: CT cervical spine 2017               FINDINGS:               
There are surgical changes of sternotomy.                There is multifocal 
dental disease.               There is diffuse idiopathic skeletal hyperostosis 
of the spine.  There       is no acute cervical spine fracture or dislocation.  
There is no       spinal canal stenosis detected.                There is 
moderate atherosclerotic calcification of the carotid       vasculature.        
      There is no cervical lymphadenopathy, mass or organized fluid       
collection.               There is mild atherosclerotic vascular calcification 
of the aorta.               The lung apices reveal no acute process.            
      IMPRESSION:                   1.  Intact cervical spine.            2.  
There is moderate atherosclerotic calcification of the carotid         
vasculature.           3.  There is multifocal dental disease.                  
   ** Electronically Signed by SVETLANA Jesus **            **            on
2019 at 1941            **                      Reported and signed by: 
Kai Jesus D.O.      PAGE  1                       Signed Report            
       (CONTINUED)   Name: CODY CHAPPELL              [facility]    
               : 1945 Age/S: 74  / F         [address] [place] [address]   
     Unit #: [id number]     Loc:               [place] [address]                
Phys: Tricia Garcia                                                 Acct: 
[id number]  Dis Date:               Status: REG ER                            
     PHONE #: [phone]     Exam Date: 2019                     
FAX #: [phone]      Reason: neck pain s/p fall                             
    EXAMS:                                               CPT CODE:      
[id number] CT C-SPINE W/O CONT                        99877               <
Continued>                                       CC: Tricia Garcia         
                                                                                
                           Technologist:KULDIP Kelly, RT(R)(CT)        CTDI:   
    DLP:        Trnscb Date/Time: 2019 () [id number]                  
    Orig Print D/T: S: 2019 ()      PAGE  2                       
Signed Report                               - CT HEAD/BRAIN W/O [id number] 
19:35:00  Name: CODY CHAPPELL              [facility]               
    : 1945 Age/S: 74  / F         [address] [place] [address] Bl         Unit 
#: [id number]     Loc:               [place] [address]                 Phys: 
Tricia Garcia                                                 Acct: 
[id number]  Dis Date:               Status: REG ER                            
     PHONE #: [phone]     Exam Date: 2019                     
FAX #: [phone]      Reason: left head pain s/p fall hitting head-on plavix/
    EXAMS:                                               CPT CODE:      
[id number] CT HEAD/BRAIN W/O CONT                     [address]35                    
UNENHANCED CT HEAD               INDICATION:  left head pain s/p fall hitting 
head-on Plavix.               TECHNIQUE: Unenhanced CT was performed from the 
skull vertex to the       foramen magnum with axial, coronal and sagittal 
reconstructions.  CT       imaging performed at this location utilizes radiation
dose       optimization technique which includes one or more of the followin)       Automated exposure control; 2) Adjustment of the mA and/or kV       
according to patient's size; 3) Use of iterative reconstruction       
techniques.  DLP (mGy-cm): 939               COMPARISONS: CT head 2017     
         FINDINGS:               The paranasal sinuses are clear as visualized. 
             The mastoid air cells and middle ears appear clear as visualized.  
             There is no acute depressed skull fracture.               There is 
a moderate burden of atherosclerotic vascular calcification       of the 
intracranial arteries.  There is a stable moderate burden of       multifocal 
chronic small vessel ischemic lesions within the white       matter and 
bilateral cerebellum. The cerebral ventricles are normal       caliber.  There 
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  
There is mild generalized brain parenchymal volume       loss. There is no 
cerebral mass effect, midline shift, intracranial       hemorrhage or acute 
large vessel territory cerebral cortical edema.                   IMPRESSION:   
               1.  There is no acute intracranial process.     There is no      
  intracranial hemorrhage or acute cerebral edema.         2.  There is a stable
moderate burden of chronic small vessel ischemic         disease lesions in the 
frontoparietal white matter and bilateral         cerebellum.  There is a small 
chronic stable infarct of the right         frontal cortex near the vertex.     
              PAGE  1                       Signed Report                    
(CONTINUED)   Name: CODY CHAPPELL              [facility]           
        : 1945 Age/S: 74  / F         [address] [place] [address]         
Unit #: [id number]     Loc:               [place] [address]                 
Phys: Tricia Garcia                                                 Acct: 
[id number]  Dis Date:               Status: REG ER                            
     PHONE #: [phone]     Exam Date: 2019                     
FAX #: [phone]      Reason: left head pain s/p fall hitting head-on plavix/
    EXAMS:                                               CPT CODE:      [id number]
[id number] CT HEAD/BRAIN W/O CONT                     10720               <Continued
>                ** Electronically Signed by SVETLANA Jesus **            **
           on 2019 at 193            **                      Reported and
signed by: Kai Jesus D.O.                                      CC: Tricia Garcia                                                                       
                                              Technologist:KULDIP Kelly, RT(
R)(CT)        CTDI:        DLP:        Trnscb Date/Time: 2019 () t.SDR
.JB33                       Orig Print D/T: S: 2019 ()      PAGE  2   
                   Signed Report                               - XR FINGER(S) 
2+V [id number] 18:49:00 FAX:         Tricia Garcia  [phone]    
[place]:   St: REG----------
---------------------------------------------------------------------  Name:   CODY HENDERSON             [facility]                    : [birth year]  Age/S: 74/F           [address] [place] [address]         Unit #: [id number]    
 Loc: [place] [address]                 Phys: Tricia Garcia  
                                              Acct: [id number] Dis Date:      
        Status: REG ER                                 PHONE #: [phone]    
Exam Date:     2019     181                   FAX #: [phone]     
Reason: thumb bruising/swelling/pain s/p fall              EXAMS:               
                               CPT CODE:      [id number] XR FINGER(S) 2+V RT     
                  53204                    Right thumb 3 view               H
ISTORY: Pain following trauma.               No comparisons.               FINDI
NGS:               There is an avulsion fracture along the dorsal aspect of the 
base of       the distal phalanx of the right thumb.  No other fracture or      
dislocation.                 IMPRESSION:                   Small avulsion fract
ure from the dorsal aspect of the base of the         distal phalanx of the righ
t thumb.                   SL:01                  ** Electronically Signed by EMMANUEL Christian **          **              on 2019 at 1849          
   **                      Reported and signed by: Salas Christian M.D.      
              CC: Tricia LEOS [employer]
nologist: RT Zaida(R)                                    Trnscrd Date/Ti
me/By: 2019 () : By: Qing            Orig Print D/T: S: 2019
(597)                         PAGE  1                       Signed Report      
                        [id number] 08:16:00* 





                Test Item       Value           Reference Range    Comments

 

                GLUBED (test code=GLUBED)    112 MG/DL                 Performed by certified  at

  [facility]





[id number] 21:20:00* 





                Test Item       Value           Reference Range    Comments

 

                GLUBED (test code=GLUBED)    115 MG/DL                 Performed by certified  at

  [facility]





- XR CHEST 1 -99-80 18:00:00 FAX:         Zach Franklin NP    [phone]
   [place]:   St: ADM----------
---------------------------------------------------------------------  Name:   CODY HENDERSON             [facility]                    : [birth year]  Age/S: 74/F           [address] [place] [address] Blvd         Unit #: [id number]    
 Loc: [place] [address]                 Phys: Zach Franklin  NP    
                                              Acct: [id number] Dis Date:      
        Status: ADM IN                                 PHONE #: [phone]    
Exam Date:     2019     6578                   FAX #: [phone]     
Reason: SOB                                                EXAMS:               
                               CPT CODE:      [id number] XR CHEST 1 V            
                  57220                    EXAM: CHEST SINGLE VIEW              
HISTORY: 74-year-old female with shortness of breath               COMPARISON: 
Chest radiograph 2019               FINDINGS: The lungs are clear. The c
ardiomediastinal silhouette is       prominent.  Aortic calcifications.  No acut
e osseous abnormality.        Left subclavian 3 chamber AICD present.  Sternotom
y wires noted.                         IMPRESSION:          1.  No acute cardiop
ulmonary abnormality.                             SL:  [id number]          ** 
Electronically Signed by KATHY Wright on 2019 at 1800 **               
      Reported and signed by: Henry Wright M.D.                        CC: Zach Franklin NP                                                                      
                                                 Technologist: Dianne Pino
os, RT(R)(M)                            Trnscrd Date/Time/By: 2019 (1800) 
: By: [email or web address]          Orig Print D/T: S: 2019 (1800)                  
      PAGE  1                       Signed Report                               
[id number] 17:28:00* 





                Test Item       Value           Reference Range    Comments

 

                GLUBED (test code=GLUBED)    109 MG/DL                 Performed by certified  at

  [facility]





[id number] 12:54:00* 





                Test Item       Value           Reference Range    Comments

 

                GLUBED (test code=GLUBED)    142 MG/DL                 Performed by certified  at

  [facility]





B-TYPE NATRIURETIC [id number] 08:18:00* 





                Test Item       Value           Reference Range    Comments

 

                B-TYPE NATRIURETIC PEPTIDE (test code=BNP)    543.9 PG/ML     0-100            





BASIC METABOLIC [id number] 07:37:00* 





                Test Item       Value           Reference Range    Comments

 

                SODIUM (test code=NA)    141 mEq/L       134-147          

 

                POTASSIUM (test code=K)    3.9 mEq/L       3.4-5.0          

 

                CHLORIDE (test code=CL)    111 mEq/L       100-108          

 

                CARBON DIOXIDE (test code=CO2)    24 mEq/L        21-33            

 

                ANION GAP (test code=GAP)    10              0-20             

 

                GLUCOSE (test code=GLU)    114 mg/dL                  

 

                BLOOD UREA NITROGEN (test code=BUN)    38 mg/dL        7-18             

 

                GLOMERULAR FILTRATION RATE (test code=GFR)    34.0            70-80           Units of measure=ml/min/1.73

 m2

 

                CREATININE (test code=CREAT)    1.5 mg/dL       0.6-1.3          

 

                CALCIUM (test code=CA)    7.8 mg/dL       8.0-10.5         





[id number] 07:37:00* 





                Test Item       Value           Reference Range    Comments

 

                PHOSPHOROUS (test code=PHOS)    4.0 mg/dL       2.5-4.9          





[id number] 07:37:00* 





                Test Item       Value           Reference Range    Comments

 

                MAGNESIUM (test code=MAG)    2.00 mg/dL      1.8-2.4          





CBC W/AUTO [id number] 07:30:00* 





                Test Item       Value           Reference Range    Comments

 

                WHITE BLOOD CELL (test code=WBC)    7.14 x10 3/uL    4.5-11.0         

 

                RED BLOOD CELL (test code=RBC)    4.83 x10 6/uL    3.54-5.02        

 

                HEMOGLOBIN (test code=HGB)    12.3 g/dL       11.0-15.0        

 

                HEMATOCRIT (test code=HCT)    40.5 %          33.0-45.0        

 

                MEAN CELL VOLUME (test code=MCV)    83.9 fL         81.0-99.0        

 

                MEAN CELL HGB (test code=MCH)    25.5 pg         27.0-33.0        

 

                MEAN CELL HGB CONCETRATION (test code=MCHC)    30.4 g/dL       33.0-37.0        

 

                RED CELL DISTRIBUTION WIDTH CV (test code=RDW)    16.1 %          11.5-14.5        

 

                RED CELL DISTRIBUTION WIDTH SD (test code=RDW-SD)    49.8 fL         37.0-54.0        

 

                PLATELET COUNT (test code=PLT)    274 x10 3/uL    150-400          

 

                MEAN PLATELET VOLUME (test code=MPV)    9.3 fL          7.0-9.0          

 

                NEUTROPHIL % (test code=NT%)    69.9 %          56.0-77.0        

 

                IMMATURE GRANULOCYTE % (test code=IG%)    0.3 %           0.0-2.0          

 

                LYMPHOCYTE % (test code=LY%)    18.9 %          14.0-32.0        

 

                MONOCYTE % (test code=MO%)    6.9 %           4.8-9.0          

 

                EOSINOPHIL % (test code=EO%)    2.5 %           0.3-3.7          

 

                BASOPHIL % (test code=BA%)    1.5 %           0.0-2.0          

 

                NUCLEATED RBC % (test code=NRBC%)    0.0 %           0-0              

 

                NEUTROPHIL # (test code=NT#)    4.99 x10 3/uL    2.0-7.6          

 

                IMMATURE GRANULOCYTE # (test code=IG#)    0.02 x10 3/uL    0.00-0.03        

 

                LYMPHOCYTE # (test code=LY#)    1.35 x10 3/uL    1.0-3.8          

 

                MONOCYTE # (test code=MO#)    0.49 x10 3/uL    0.1-0.8          

 

                EOSINOPHIL # (test code=EO#)    0.18 x10 3/uL    0.0-0.2          

 

                BASOPHIL # (test code=BA#)    0.11 x10 3/uL    0.0-0.2          

 

                NUCLEATED RBC # (test code=NRBC#)    0.00 x10 3/uL    0.0-0.1          

 

                MANUAL DIFF REQUIRED (test code=MDIFF)    NO                               





[id number] 01:29:00* 





                Test Item       Value           Reference Range    Comments

 

                GLUBED (test code=GLUBED)    110 MG/DL                 Performed by certified  at

  [facility]





[id number] 16:53:00* 





                Test Item       Value           Reference Range    Comments

 

                GLUBED (test code=GLUBED)    109 MG/DL                 Performed by certified  at

  [facility]





[id number] 16:37:00* 





                Test Item       Value           Reference Range    Comments

 

                TROPONIN-I (test code=TROPI)    0.020 ng/mL     0.000-0.045     Negative:             <=0.045

 Positive:             >=0.046 Correlation with serial results, other cardiac 
markers andclinical findings is necessary to determine the clinicalsignificance 
of this result. Results using different methodologies should not be comparedto 
one another as quantitative results may vary by method.





COMMENTS: 3 troponins total (including troponin done in ED)HGBA1C%2019 
16:35:00* 





                Test Item       Value           Reference Range    Comments

 

                HGBA1C% (test code=HGBA1C%)    6.8 %A1C        4.8-6.0          





[id number] 13:29:00* 





                Test Item       Value           Reference Range    Comments

 

                D-DIMER (test code=DDIMER)    505 ng/mlFEU    <=500            THROMBOSIS AND/OR PULMONARY EMBOLISM

 AND THE CLINICAL CUT- OFF VALUE FOR EXCLUSION (500 ng/mL FEU) OF THESE 
CONDITIONSIS VALIDATED BY THE  OF THE METHOD. A NEGATIVE D-DIMER 
RESULT WHEN COMBINED WITH A CLINICALASSESSMENT OF LOW PRETEST PROBABILITY HAS 
BEEN SHOWN TO HAVEA HIGH NEGATIVE PREDICTIVE VALUE OF DVT OR PE. D-DIMER VALUES 
>500 ng/mL FEU ARE NOT DIAGNOSTIC FOR DVT, PEor DIC WITHOUT OTHER CONFIRMATORY 
TESTS AND APPROPRIATECLINICAL EUALUATIONS.





B-TYPE NATRIURETIC [id number] 13:22:00* 





                Test Item       Value           Reference Range    Comments

 

                B-TYPE NATRIURETIC PEPTIDE (test code=BNP)    1024.1 PG/ML    0-100            





[id number] 13:02:00* 





                Test Item       Value           Reference Range    Comments

 

                TROPONIN-I (test code=TROPI)    0.028 ng/mL     0.000-0.045     Negative:             <=0.045

 Positive:             >=0.046 Correlation with serial results, other cardiac 
markers andclinical findings is necessary to determine the clinicalsignificance 
of this result. Results using different methodologies should not be comparedto 
one another as quantitative results may vary by method.





COMMENTS: 3 troponins total (including troponin done in ED)CBC W/AUTO DIFF
2019 12:39:00* 





                Test Item       Value           Reference Range    Comments

 

                WHITE BLOOD CELL (test code=WBC)    8.44 x10 3/uL    4.5-11.0         

 

                RED BLOOD CELL (test code=RBC)    4.99 x10 6/uL    3.54-5.02        

 

                HEMOGLOBIN (test code=HGB)    12.8 g/dL       11.0-15.0        

 

                HEMATOCRIT (test code=HCT)    43.1 %          33.0-45.0        

 

                MEAN CELL VOLUME (test code=MCV)    86.4 fL         81.0-99.0        

 

                MEAN CELL HGB (test code=MCH)    25.7 pg         27.0-33.0        

 

                MEAN CELL HGB CONCETRATION (test code=MCHC)    29.7 g/dL       33.0-37.0        

 

                RED CELL DISTRIBUTION WIDTH CV (test code=RDW)    16.2 %          11.5-14.5        

 

                RED CELL DISTRIBUTION WIDTH SD (test code=RDW-SD)    51.2 fL         37.0-54.0        

 

                PLATELET COUNT (test code=PLT)    292 x10 3/uL    150-400          

 

                MEAN PLATELET VOLUME (test code=MPV)    9.2 fL          7.0-9.0          

 

                NEUTROPHIL % (test code=NT%)    69.8 %          56.0-77.0        

 

                IMMATURE GRANULOCYTE % (test code=IG%)    0.5 %           0.0-2.0          

 

                LYMPHOCYTE % (test code=LY%)    19.5 %          14.0-32.0        

 

                MONOCYTE % (test code=MO%)    6.6 %           4.8-9.0          

 

                EOSINOPHIL % (test code=EO%)    1.9 %           0.3-3.7          

 

                BASOPHIL % (test code=BA%)    1.7 %           0.0-2.0          

 

                NUCLEATED RBC % (test code=NRBC%)    0.0 %           0-0              

 

                NEUTROPHIL # (test code=NT#)    5.89 x10 3/uL    2.0-7.6          

 

                IMMATURE GRANULOCYTE # (test code=IG#)    0.04 x10 3/uL    0.00-0.03        

 

                LYMPHOCYTE # (test code=LY#)    1.65 x10 3/uL    1.0-3.8          

 

                MONOCYTE # (test code=MO#)    0.56 x10 3/uL    0.1-0.8          

 

                EOSINOPHIL # (test code=EO#)    0.16 x10 3/uL    0.0-0.2          

 

                BASOPHIL # (test code=BA#)    0.14 x10 3/uL    0.0-0.2          

 

                NUCLEATED RBC # (test code=NRBC#)    0.00 x10 3/uL    0.0-0.1          

 

                MANUAL DIFF REQUIRED (test code=MDIFF)    NO                               





TROPONIN-I [id number] 12:19:00* 





                Test Item       Value           Reference Range    Comments

 

                TROPONIN-I RAPID (test code=TROPIRAP)    0.01 ng/mL      0.00-0.08       Performed by certified

  at  [facility]          Negative:          <=0.08          
Positive:          >=0.09An elevated troponin value alone is not sufficient 
todiagnose a myocardial infarction. Rather, the patient sclinical presentation 
(history, physical exam) and ECGshould be used in conjunction with troponin in 
thediagnostic evaluation of suspected myocardial infarction. Aserial sampling 
protocol is recommended to facilitate the identification of temporal changes in 
troponin levels characteristic of MI.





CHEMISTRY 8 [id number] 12:14:00* 





                Test Item       Value           Reference Range    Comments

 

                ISTAT-SODIUM (test code=NAP)     MMOL/L         134-147          

 

                ISTAT-POTASSIUM (test code=KP)     MMOL/L         3.4-5.0          

 

                ISTAT-CHLORIDE (test code=CLP)     MMOL/L         100-108          

 

                ISTAT CARBON DIOXIDE (test code=ISTAT-CO2)     mmol/L         21-33            

 

                ISTAT CALCIUM IONIZED (test code=ISTAT-EDA)     MG/DL          1.12-1.32        

 

                ISTAT-GLUCOSE (test code=GLUP)     MG/DL                     

 

                ISTAT-BUN (test code=BUNP)     MG/DL          7-18             

 

                BEDSIDE CREATININE (test code=CREATBED)     MG/DL          0.6-1.3          

 

                GLOMERULAR FILTRATION RATE POC (test code=GFRBED)    36 ML/MIN                        





CHEMISTRY 8 [id number] 12:14:00* 





                Test Item       Value           Reference Range    Comments

 

                ISTAT-SODIUM (test code=NAP)    141 MMOL/L      134-147          

 

                ISTAT-POTASSIUM (test code=KP)    5.2 MMOL/L      3.4-5.0          

 

                ISTAT-CHLORIDE (test code=CLP)    105 MMOL/L      100-108         Performed by certified 

 at  [facility]

 

                ISTAT CARBON DIOXIDE (test code=ISTAT-CO2)    27.0 mmol/L     21-33            

 

                ISTAT CALCIUM IONIZED (test code=ISTAT-EDA)    1.14 MG/DL      1.12-1.32        

 

                ISTAT-GLUCOSE (test code=GLUP)    138 MG/DL                  

 

                ISTAT-BUN (test code=BUNP)    44 MG/DL        7-18             

 

                BEDSIDE CREATININE (test code=CREATBED)    1.5 MG/DL       0.6-1.3          

 

                GLOMERULAR FILTRATION RATE POC (test code=GFRBED)    36 ML/MIN                        





- XR CHEST 1 -58-06 11:38:00 FAX:         Miguel Belle   [phone]
   [place]:   St: REG----------
---------------------------------------------------------------------  Name:   CODY HENDERSON             [facility]                    : [birth year]  Age/S: 74/F           [address] [place] [address]         Unit #: [id number]    
 Loc: [place] [address]                 Phys: Miguel Belle   
                                              Acct: [id number] Dis Date:      
        Status: REG ER                                 PHONE #: [phone]    
Exam Date:     2019                   FAX #: [phone]     
Reason: Chest Pain                                         EXAMS:               
                               CPT CODE:      [id number] XR CHEST 1 V            
                  [id number]                    PROCEDURE: CHEST SINGLE VIEW         
     INDICATION: Chest pain               COMPARISON: 2019              
FINDINGS: AP portable chest obtained semiupright with the patient       slightly
rotated to the left.  Clothing artifact noted.  Pacemaker/ICD       leads in 
place.  Previous median sternotomy.  Mild prominence of the       cardiac eileen
houette is stable allowing for rotation.  No gross       pulmonary vascular sebastian
estion.  The lungs are clear.  No pleural       abnormality.  No acute skeletal 
abnormality.                 IMPRESSION: No acute radiographic abnormality.  Enl
arged cardiac         silhouette.  No overt failure at this time.               
             SL:  [id number]                   ** Electronically Signed by EMMANUEL Painter **           **             on 2019 at 1138            
**                      Reported and signed by: Regulo Painter M.D.           
         CC: Miguel VEGAS                                                  
                                                                    Technolog
ist: Duane Stucker, RT(R)                                   Trnscrd Date/Time/By
: 2019 (1138) : By: Kye           Orig Print D/T: S: 2019 (114
1)                         PAGE  1                       Signed Report          
                    [id number] 12:22:00* 





                Test Item       Value           Reference Range    Comments

 

                GLUBED (test code=GLUBED)    127 MG/DL                 Performed by certified  at

  [facility]





[id number] 03:56:00* 





                Test Item       Value           Reference Range    Comments

 

                GLUBED (test code=GLUBED)    147 MG/DL                 Performed by certified  at

  [facility]





[id number] 15:54:00* 





                Test Item       Value           Reference Range    Comments

 

                GLUBED (test code=GLUBED)    170 MG/DL                 Performed by certified  at

  [facility]





[id number] 11:47:00* 





                Test Item       Value           Reference Range    Comments

 

                GLUBED (test code=GLUBED)    78 MG/DL                  Performed by certified  at 

 [facility]





[id number] 09:00:00* 





                Test Item       Value           Reference Range    Comments

 

                GLUBED (test code=GLUBED)    160 MG/DL                 Performed by certified  at

  [facility]





LACTIC ACID [id number] 22:57:00* 





                Test Item       Value           Reference Range    Comments

 

                LACTIC ACID REPEAT (test code=LACTR)    1.1 mmol/L      0.4-1.9          





URINALYSIS [id number] 21:28:00* 





                Test Item       Value           Reference Range    Comments

 

                UA COLOR (test code=COLU)    YELLOW          YEL/STRAW        

 

                UA APPEARANCE (test code=APPU)    CLEAR           CLEAR            

 

                UA GLUCOSE DIPSTICK (test code=DGLUU)    NEGATIVE        NEGATIVE         

 

                UA BILIRUBIN DIPSTICK (test code=BILU)    NEGATIVE        NEGATIVE         

 

                UA KETONE DIPSTICK (test code=KETU)    NEGATIVE        NEGATIVE         

 

                UA SPECIFIC GRAVITY (test code=SGU)    1.015           1.005-1.030      

 

                UA BLOOD DIPSTICK (test code=JOSE CARLOS)    1+              NEGATIVE         

 

                UA PH DIPSTICK (test code=MARLIN)    6.0             5.0-7.0          

 

                UA PROTEIN DIPSTICK (test code=PROU)    4+              NEGATIVE         

 

                UA UROBILINIOGEN DIPSTICK (test code=URO)    0.2 mg/dL       0.2-1.0          

 

                UA NITRITE DIPSTICK (test code=MARANDA)    NEGATIVE        NEGATIVE         

 

                UA LEUKOCYTE ESTERASE DIPSTICK (test code=LEUU)    3+              NEGATIVE         

 

                UA WBC (test code=WBCU)    10-20 WBC/HPF    0-3              

 

                UA RBC (test code=RBCU)    0-3 RBC/HPF     0-3              

 

                UA BACTERIA (test code=BACU)    TRACE /HPF      NONE SEEN        

 

                UA SQUAMOUS CELLS (test code=SQU)    0-5 /HPF        NONE SEEN        

 

                UA MUCUS (test code=MUCU)    TRACE /LPF      NONE SEEN        





URINALYSIS [id number] 21:23:00* 





                Test Item       Value           Reference Range    Comments

 

                UA COLOR (test code=COLU)    YELLOW          YEL/STRAW        

 

                UA APPEARANCE (test code=APPU)    CLEAR           CLEAR            

 

                UA GLUCOSE DIPSTICK (test code=DGLUU)    NEGATIVE        NEGATIVE         

 

                UA BILIRUBIN DIPSTICK (test code=BILU)    NEGATIVE        NEGATIVE         

 

                UA KETONE DIPSTICK (test code=KETU)    NEGATIVE        NEGATIVE         

 

                UA SPECIFIC GRAVITY (test code=SGU)    1.015           1.005-1.030      

 

                UA BLOOD DIPSTICK (test code=JOSE CARLOS)    1+              NEGATIVE         

 

                UA PH DIPSTICK (test code=MARLIN)    6.0             5.0-7.0          

 

                UA PROTEIN DIPSTICK (test code=PROU)    4+              NEGATIVE         

 

                UA UROBILINIOGEN DIPSTICK (test code=URO)    0.2 mg/dL       0.2-1.0          

 

                UA NITRITE DIPSTICK (test code=MARANDA)    NEGATIVE        NEGATIVE         

 

                UA LEUKOCYTE ESTERASE DIPSTICK (test code=LEUU)    3+              NEGATIVE         

 

                UA WBC (test code=WBCU)     WBC/HPF        0-3              

 

                UA RBC (test code=RBCU)     RBC/HPF        0-3              





- CT ABD PELVIS W/O [id number] 21:18:00  Name: MISTICODY VELAERO         
    [facility]             : 1945 Age/S: 73  / F         
[address] [place] [address]         Unit #: [id number]     Loc:               
[place] [address]                 Phys: Rodney Donis MD                            
                           Acct: [id number]  Dis Date:               Status: 
REG ER                                  PHONE #: [phone]     Exam Date: 
2019                     FAX #: [phone]      Reason: concern 
for renal stone                             EXAMS:                              
                CPT CODE:      [id number] CT ABD PELVIS W/O CONT                 
   [id number]                    PROCEDURE:        CT abdomen and pelvis without 
contrast.  Reconstruction images.               INDICATION: Clinical concern for
renal calculi.       Lower back pain with decreased urination.  No history of 
fever               TECHNIQUE:        GI CONTRAST: None.       IV CONTRAST: 
None.       Helical axial imaging was performed from the diaphragm through the  
    symphysis.  Coronal and sagittal reformations obtained.               
COMPARISON: Chest radiograph dated 2019.  CT abdomen pelvis with       out 
contrast dated 3/23/2018.  CT abdomen and pelvis with contrast       dated 
2013.               FINDINGS:        This examination is limited for the 
evaluation of solid organs and       vascular structures due to lack of in
travenous contrast.               LOWER CHEST: Small bilateral pleural effusions
.  The heart is       enlarged.  Cardiac leads identified.  Median sternotomy wi
res.  Linear       bibasilar scarring or atelectasis identified.               S
OLID ORGANS: Gallbladder has been surgically removed.  Liver is       grossly un
remarkable.  No biliary dilatation.  Normal pancreas.        Spleen is not enlar
ged.  No adrenal nodularity.  Mild bilateral renal       atrophy.  No renal calc
lubna or hydronephrosis.  No significant       retroperitoneal edema.             
 BOWEL: Distal esophagus, stomach appear normal.  Normal small bowel       with
in the abdomen pelvis.  No obstruction or ileus.  No focal       inflammation.  
Scattered colonic diverticulosis.  No focal acute       colonic inflammation.  N
ormal appendix in the right lower quadrant.               PERITONEUM: Small volu
me fluid in the pelvis.  No free air.  No       ventral wall defects.  No well-d
efined fluid collection.               RETROPERITONEUM: Mild to moderate vascula
r wall calcifications.  No       aneurysm.  No enlarged retroperitoneal lymphade
nopathy.               PELVIS: Hypodensity in the right adnexa may represent ova
td cyst       measuring up to 24 mm.  This demonstrates simple well-defined   
   homogeneous hypodense appearance and measures 5-6 Hounsfield units.        T
his could represent a bladder diverticulum considering the chronic     PAGE  1  
                    Signed Report                    (CONTINUED)   Name: CODY CHAPPELLERO              [facility]             : 1945 A
ge/S: 73  / F         [address] [place], [place] [address]         Unit #: [id number]     Loc
:               [place] [address]                 Phys: Rodney Donis MD            
                                           Acct: [id number]  Dis Date:        
      Status: REG ER                                  PHONE #: [phone]     
Exam Date: 2019                     FAX #: [phone]      Reason:
concern for renal stone                             EXAMS:                      
                        CPT CODE:      [id number] CT ABD PELVIS W/O CONT         
           [id number]               <Continued>        stable appearance.  Vascular 
wall calcifications in the pelvis.        Uterus and adnexa appear otherwise 
unremarkable.               MUSCULOSKELETAL: Diffusely decreased bone density.  
Multilevel       degenerative changes identified.                 IMPRESSION:   
     1. Small bilateral pleural effusions.  Cardiomegaly.  Postsurgical         
changes in the lower chest.         2.  Cholecystectomy.         3.  No urinary 
calculi, hydronephrosis or retroperitoneal edema.         4.  Colonic 
diverticulosis.         5.  Small volume free fluid in the pelvis.         6.  
Simple appearing right ovarian cyst or bladder diverticulum         demonstrates
stable appearance.                   SL: MRODHIREN-H                 ** 
Electronically Signed by KATHY Castro **         **                
on 2019 at                **                      Reported and signed 
by: Dieter Castro M.D.                    CC: Rodney Donis MD; Edgard Zurita MD                                                                        
                            Technologist:Shelli Sevilla RT(R)          CTDI:  
     DLP:        Trnscb Date/Time: 2019 () t.SDR.MSR4                 
     Orig Print D/T: S: 2019 ()       CTDI:          DLP:          
PAGE  2                       Signed Report                               B-TYPE
NATRIURETIC [id number] 20:49:00* 





                Test Item       Value           Reference Range    Comments

 

                B-TYPE NATRIURETIC PEPTIDE (test code=BNP)    2467.1 PG/ML    0-100            





- XR CHEST 1 -62-37 20:33:00 FAX: Rodney Nix MD         [phone]
   [place]:   St: [facility] FAX: Edgard Churchill  [phone]   
------------------------------------------------------------------------------- 
Name:   MISTICODY NAILA             [facility]             :
1945  Age/S: 73/F           [address] [place] [address]         Unit #: 
[id number]      Loc: [place] [address]                 Phys: 
Rodney Donis MD                                                        Acct: 
[id number] Dis Date:               Status: REG ER                             
   PHONE #: [phone]     Exam Date:     2019                 
 FAX #: [phone]     Reason: Chest Pain                                     
   EXAMS:                                               CPT CODE:      [id number]
XR CHEST 1 V                               [id number]                    PROCEDURE:  
     Chest, AP on 2019 at 2019 hours.               INDICATION: Chest Pain.
      Lower back pain.  Decreased urination.  No fever.               
COMPARISON: Chest radiographs dated 2018, 2018.               
FINDINGS:        Support tubes, catheters, devices:  Stable left chest pacemaker
      defibrillator device with multiple leads overlying the mediastinum.       
Midline median sternotomy wires.               No pleural effusion or 
pneumothorax.  Cardiac silhouette is enlarged.        Mediastinum is stable with
postsurgical changes.  Lungs are clear.  No       significant central pulmonary 
venous congestion.                 IMPRESSION:         1.  Stable enlarged 
cardiac silhouette with postsurgical changes         overlying the mediastinum. 
                 SL: MRODAKILAHUEZ-H                 ** Electronically Signed by 
KATHY Castro **         **                on 2019 at     
          **                      Reported and signed by: Dieter Castro M.D.                CC: Rodney Donis MD; Edgard Zurita MD                     
                                                                               
Technologist: RT Marquez(R)                                Trntevin Wu
te/Time/By: 2019 () : By: [email or web address]          Orig Print D/T: S:  ()                         PAGE  1                       Signed Report
                              HEPATIC FUNCTION [id number] 20:17:00* 





                Test Item       Value           Reference Range    Comments

 

                TOTAL PROTEIN (test code=PROT)    8.5 g/dL        6.4-8.2          

 

                ALBUMIN (test code=ALB)    3.50 g/dL       3.4-5.0          

 

                BILIRUBIN TOTAL (test code=BILT)    0.70 mg/dL      0.0-1.0          

 

                BILIRUBIN DIRECT (test code=BILD)    0.30 MG/DL      0.0-0.30         

 

                BILIRUBIN INDIRECT (test code=BILIND)    0.40 MG/DL                       

 

                SGOT/AST (test code=AST)    15 IUnit/L      15-37            

 

                SGPT/ALT (test code=ALT)    16 IUnit/L      15-65            

 

                ALKALINE PHOSPHATASE TOTAL (test code=ALKP)    162 IUnit/L                





[id number] 20:17:00* 





                Test Item       Value           Reference Range    Comments

 

                LIPASE (test code=LIP)    168 IUnit/L                





CBC W/AUTO [id number] 20:08:00* 





                Test Item       Value           Reference Range    Comments

 

                WHITE BLOOD CELL (test code=WBC)    10.52 x10 3/uL    4.5-11.0         

 

                RED BLOOD CELL (test code=RBC)    4.95 x10 6/uL    3.54-5.02        

 

                HEMOGLOBIN (test code=HGB)    13.2 g/dL       11.0-15.0        

 

                HEMATOCRIT (test code=HCT)    43.8 %          33.0-45.0        

 

                MEAN CELL VOLUME (test code=MCV)    88.5 fL         81.0-99.0        

 

                MEAN CELL HGB (test code=MCH)    26.7 pg         27.0-33.0        

 

                MEAN CELL HGB CONCETRATION (test code=MCHC)    30.1 g/dL       33.0-37.0        

 

                RED CELL DISTRIBUTION WIDTH CV (test code=RDW)    16.7 %          11.5-14.5        

 

                RED CELL DISTRIBUTION WIDTH SD (test code=RDW-SD)    54.0 fL         37.0-54.0        

 

                PLATELET COUNT (test code=PLT)    326 x10 3/uL    150-400          

 

                MEAN PLATELET VOLUME (test code=MPV)    9.1 fL          7.0-9.0          

 

                NEUTROPHIL % (test code=NT%)    73.1 %          56.0-77.0        

 

                IMMATURE GRANULOCYTE % (test code=IG%)    1.0 %           0.0-2.0          

 

                LYMPHOCYTE % (test code=LY%)    16.2 %          14.0-32.0        

 

                MONOCYTE % (test code=MO%)    7.3 %           4.8-9.0          

 

                EOSINOPHIL % (test code=EO%)    0.9 %           0.3-3.7          

 

                BASOPHIL % (test code=BA%)    1.5 %           0.0-2.0          

 

                NUCLEATED RBC % (test code=NRBC%)    0.0 %           0-0              

 

                NEUTROPHIL # (test code=NT#)    7.69 x10 3/uL    2.0-7.6          

 

                IMMATURE GRANULOCYTE # (test code=IG#)    0.11 x10 3/uL    0.00-0.03        

 

                LYMPHOCYTE # (test code=LY#)    1.70 x10 3/uL    1.0-3.8          

 

                MONOCYTE # (test code=MO#)    0.77 x10 3/uL    0.1-0.8          

 

                EOSINOPHIL # (test code=EO#)    0.09 x10 3/uL    0.0-0.2          

 

                BASOPHIL # (test code=BA#)    0.16 x10 3/uL    0.0-0.2          

 

                NUCLEATED RBC # (test code=NRBC#)    0.00 x10 3/uL    0.0-0.1          

 

                MANUAL DIFF REQUIRED (test code=MDIFF)    NO                               





TROPONIN-I [id number] 19:58:00* 





                Test Item       Value           Reference Range    Comments

 

                TROPONIN-I RAPID (test code=TROPIRAP)    0.03 ng/mL      0.00-0.08       Performed by certified

  at  [facility] Global Task Force with joint leadership from 
the EuropeanSociety of Cardiology (ESC), the American College of Cardiology 
Foundation (ACCF), the American Heart Association(AHA) and the World [employer] 
Federation (WHF) refined past criteria of myocardial infarction (MI) with a 
universal definition of myocardial infarction that supports the use of cTnI as a
preferred biomarker for myocardial injury. The universal definition of MI, 
according to this taskforce, is defined as a typical rise and gradual fall 
ofcardiac biomarkers (preferably troponin) with at least onevalue above the 99th
percentile of the upper reference limit (URL) together with evidence of 
myocardial ischemia with at least one of the following:* ischemic symptoms,* 
pathological Q waves on electrocardiogram (ECG),* ischemic ECG changes,* or 
imaging evidence of new loss of viable myocardium   or new regional wall motion 
abnormality. An elevated troponin value alone is not sufficient todiagnose a 
myocardial infarction. Rather, the patient sclinical presentation (history, 
physical exam) and ECGshould be used in conjunction with troponin in 
thediagnostic evaluation of suspected myocardial infarction. Aserial sampling 
protocol is recommended to facilitate the identification of temporal changes in 
troponin levels characteristic of MI.





LACTIC ACID [id number] 19:53:00* 





                Test Item       Value           Reference Range    Comments

 

                LACTIC ACID POC (test code=LACTP)    2.1 MMOL/L      0.90-1.70       Performed by certified

  at  [facility]





CHEMISTRY 8 [id number] 19:51:00* 





                Test Item       Value           Reference Range    Comments

 

                ISTAT-SODIUM (test code=NAP)     MMOL/L         134-147          

 

                ISTAT-POTASSIUM (test code=KP)     MMOL/L         3.4-5.0          

 

                ISTAT-CHLORIDE (test code=CLP)     MMOL/L         100-108          

 

                ISTAT CARBON DIOXIDE (test code=ISTAT-CO2)     mmol/L         21-33            

 

                ISTAT CALCIUM IONIZED (test code=ISTAT-EDA)     MG/DL          1.12-1.32        

 

                ISTAT-GLUCOSE (test code=GLUP)     MG/DL                     

 

                ISTAT-BUN (test code=BUNP)     MG/DL          7-18             

 

                BEDSIDE CREATININE (test code=CREATBED)     MG/DL          0.6-1.3          

 

                GLOMERULAR FILTRATION RATE POC (test code=GFRBED)    36 ML/MIN                        





CHEMISTRY 8 [id number] 19:51:00* 





                Test Item       Value           Reference Range    Comments

 

                ISTAT-SODIUM (test code=NAP)    141 MMOL/L      134-147          

 

                ISTAT-POTASSIUM (test code=KP)    3.9 MMOL/L      3.4-5.0          

 

                ISTAT-CHLORIDE (test code=CLP)    106 MMOL/L      100-108         Performed by certified 

 at  [facility]

 

                ISTAT CARBON DIOXIDE (test code=ISTAT-CO2)    22.0 mmol/L     21-33            

 

                ISTAT CALCIUM IONIZED (test code=ISTAT-EDA)    1.09 MG/DL      1.12-1.32        

 

                ISTAT-GLUCOSE (test code=GLUP)    135 MG/DL                  

 

                ISTAT-BUN (test code=BUNP)    34 MG/DL        7-18             

 

                BEDSIDE CREATININE (test code=CREATBED)    1.5 MG/DL       0.6-1.3          

 

                GLOMERULAR FILTRATION RATE POC (test code=GFRBED)    36 ML/MIN

## 2020-03-26 NOTE — NUR
0943a Received pt to room #10,bedside report received from MAREK Tyler. Alert oriented and 
appropriate, PERRLA, respirations even and unlabored to room air. Pulses x4 extremities 
equal and strong. Pedal pulses PT/DP X4 and marked. Cap fill brisk < 3 sec.Rt groin Mynx 
site No gross issues pain pallor, pressure or dysrhythmia.Down time 130pm Daughter in lw at 
BS and dc  papers filled out. 



Skin warm and dry integrity appears left hand IV 20g 100cchrs for 250cc,  presents healthy 
w/o s/s of infiltration or complaint. Abdomen soft and supple. pt offered toileting, denies 
need to urinate or defecate. No personal affects with patient.  Family daughter in law. Pt 
and family verbalizes understanding of POC. 



Currently w/o complaint of pain or need. ds/rn